# Patient Record
Sex: MALE | URBAN - METROPOLITAN AREA
[De-identification: names, ages, dates, MRNs, and addresses within clinical notes are randomized per-mention and may not be internally consistent; named-entity substitution may affect disease eponyms.]

---

## 2024-02-06 ENCOUNTER — TRANSCRIBE ORDERS (OUTPATIENT)
Facility: HOSPITAL | Age: 44
End: 2024-02-06

## 2024-02-06 DIAGNOSIS — Z00.00 PREVENTATIVE HEALTH CARE: Primary | ICD-10-CM

## 2024-02-20 ENCOUNTER — HOSPITAL ENCOUNTER (OUTPATIENT)
Facility: HOSPITAL | Age: 44
Discharge: HOME | End: 2024-02-23
Attending: INTERNAL MEDICINE

## 2024-02-20 DIAGNOSIS — Z00.00 PREVENTATIVE HEALTH CARE: ICD-10-CM

## 2024-02-20 PROCEDURE — 75571 CT HRT W/O DYE W/CA TEST: CPT

## 2024-02-21 NOTE — CARDIO/PULMONARY
Reached patient's mobile voicemail (identified as his in the message).  Left a voice message with his coronary artery CT score of zero, a brief description of the meaning of this score, and my phone number in case of any questions.